# Patient Record
Sex: FEMALE | Race: WHITE
[De-identification: names, ages, dates, MRNs, and addresses within clinical notes are randomized per-mention and may not be internally consistent; named-entity substitution may affect disease eponyms.]

---

## 2019-02-27 ENCOUNTER — HOSPITAL ENCOUNTER (EMERGENCY)
Dept: HOSPITAL 96 - M.ERS | Age: 44
Discharge: HOME | End: 2019-02-27
Payer: COMMERCIAL

## 2019-02-27 VITALS — HEIGHT: 68 IN | WEIGHT: 250 LBS | BODY MASS INDEX: 37.89 KG/M2

## 2019-02-27 VITALS — SYSTOLIC BLOOD PRESSURE: 143 MMHG | DIASTOLIC BLOOD PRESSURE: 86 MMHG

## 2019-02-27 DIAGNOSIS — Z85.3: ICD-10-CM

## 2019-02-27 DIAGNOSIS — R10.11: Primary | ICD-10-CM

## 2019-02-27 DIAGNOSIS — R11.2: ICD-10-CM

## 2019-02-27 DIAGNOSIS — G43.909: ICD-10-CM

## 2019-02-27 DIAGNOSIS — Z88.5: ICD-10-CM

## 2019-02-27 DIAGNOSIS — Z90.13: ICD-10-CM

## 2019-02-27 DIAGNOSIS — Z90.710: ICD-10-CM

## 2019-02-27 DIAGNOSIS — Z88.6: ICD-10-CM

## 2019-02-27 DIAGNOSIS — F41.9: ICD-10-CM

## 2019-02-27 LAB
ABSOLUTE BASOPHILS: 0 THOU/UL (ref 0–0.2)
ABSOLUTE EOSINOPHILS: 0.2 THOU/UL (ref 0–0.7)
ABSOLUTE MONOCYTES: 0.6 THOU/UL (ref 0–1.2)
ALBUMIN SERPL-MCNC: 3.6 G/DL (ref 3.4–5)
ALP SERPL-CCNC: 140 U/L (ref 46–116)
ALT SERPL-CCNC: 76 U/L (ref 30–65)
ANION GAP SERPL CALC-SCNC: 5 MMOL/L (ref 7–16)
AST SERPL-CCNC: 36 U/L (ref 15–37)
BASOPHILS NFR BLD AUTO: 0.6 %
BILIRUB SERPL-MCNC: 0.2 MG/DL
BILIRUB UR-MCNC: NEGATIVE MG/DL
BUN SERPL-MCNC: 9 MG/DL (ref 7–18)
CALCIUM SERPL-MCNC: 8.6 MG/DL (ref 8.5–10.1)
CHLORIDE SERPL-SCNC: 103 MMOL/L (ref 98–107)
CO2 SERPL-SCNC: 31 MMOL/L (ref 21–32)
COLOR UR: YELLOW
CREAT SERPL-MCNC: 0.7 MG/DL (ref 0.6–1.3)
EOSINOPHIL NFR BLD: 3.5 %
GLUCOSE SERPL-MCNC: 110 MG/DL (ref 70–99)
GRANULOCYTES NFR BLD MANUAL: 57.3 %
HCT VFR BLD CALC: 37 % (ref 37–47)
HGB BLD-MCNC: 12.1 GM/DL (ref 12–15)
KETONES UR STRIP-MCNC: NEGATIVE MG/DL
LIPASE: 101 U/L (ref 73–393)
LYMPHOCYTES # BLD: 1.6 THOU/UL (ref 0.8–5.3)
LYMPHOCYTES NFR BLD AUTO: 27.9 %
MCH RBC QN AUTO: 27 PG (ref 26–34)
MCHC RBC AUTO-ENTMCNC: 32.7 G/DL (ref 28–37)
MCV RBC: 82.6 FL (ref 80–100)
MONOCYTES NFR BLD: 10.7 %
MPV: 8 FL. (ref 7.2–11.1)
NEUTROPHILS # BLD: 3.3 THOU/UL (ref 1.6–8.1)
NUCLEATED RBCS: 0 /100WBC
PLATELET COUNT*: 252 THOU/UL (ref 150–400)
POTASSIUM SERPL-SCNC: 3.7 MMOL/L (ref 3.5–5.1)
PROT SERPL-MCNC: 7.2 G/DL (ref 6.4–8.2)
PROT UR QL STRIP: NEGATIVE
RBC # BLD AUTO: 4.48 MIL/UL (ref 4.2–5)
RBC # UR STRIP: NEGATIVE /UL
RDW-CV: 14.6 % (ref 10.5–14.5)
SODIUM SERPL-SCNC: 139 MMOL/L (ref 136–145)
SP GR UR STRIP: 1.02 (ref 1–1.03)
TROPONIN-I LEVEL: <0.06 NG/ML (ref ?–0.06)
URINE CLARITY: CLEAR
URINE GLUCOSE-RANDOM: NEGATIVE
URINE LEUKOCYTES-REFLEX: NEGATIVE
URINE NITRITE-REFLEX: NEGATIVE
UROBILINOGEN UR STRIP-ACNC: 0.2 E.U./DL (ref 0.2–1)
WBC # BLD AUTO: 5.7 THOU/UL (ref 4–11)

## 2019-02-28 NOTE — EKG
[place] [address]
Phone:  [phone]                     ELECTROCARDIOGRAM REPORT      
_______________________________________________________________________________
 
Name:       NUHA ROSARIO                     Room:                      [facility]#:  [id number]      Account #:      [id number]  
Admission:  19     Attend Phys:                         
Discharge:  19     Date of Birth:  [birth year]  
         Report #: [id number]
    [id number]
_______________________________________________________________________________
THIS REPORT FOR:  //name//                      
 
                          [facility]
                                       
Test Date:    2019               Test Time:    16:28:24
Pat Name:     NUHA ROSARIO                 Department:   
Patient ID:   [id number]            Room:          
Gender:       F                        Technician:   ARIS
:          1975               Requested By: Rebeka Casarez
Order Number: [id number] MD:   Sonu Guzman
                                 Measurements
Intervals                              Axis          
Rate:         93                       P:            28
SC:           172                      QRS:          -6
QRSD:         95                       T:            7
QT:           380                                    
QTc:          473                                    
                           Interpretive Statements
Sinus rhythm
Compared to ECG 2014 09:07:51
Sinus tachycardia no longer present
 
Electronically Signed On 2019 11:16:33 CST by Sonu Guzman
[email or web address]
 
 
 
 
 
 
 
 
 
 
 
 
 
 
 
 
 
 
 
  <ELECTRONICALLY SIGNED>
                                           By: Sonu Guzman MD, [facility]   
  19     1116
D: 19 1628   _____________________________________
T: 19 1628   Sonu Guzman MD, FACC     /EPI

## 2020-01-07 ENCOUNTER — HOSPITAL ENCOUNTER (EMERGENCY)
Dept: HOSPITAL 96 - M.ERS | Age: 45
Discharge: HOME | End: 2020-01-07
Payer: COMMERCIAL

## 2020-01-07 VITALS — DIASTOLIC BLOOD PRESSURE: 96 MMHG | SYSTOLIC BLOOD PRESSURE: 163 MMHG

## 2020-01-07 VITALS — HEIGHT: 66 IN | BODY MASS INDEX: 41.79 KG/M2 | WEIGHT: 260.01 LBS

## 2020-01-07 DIAGNOSIS — F41.9: ICD-10-CM

## 2020-01-07 DIAGNOSIS — G89.29: ICD-10-CM

## 2020-01-07 DIAGNOSIS — G43.909: Primary | ICD-10-CM

## 2020-01-07 DIAGNOSIS — Z90.13: ICD-10-CM

## 2020-01-07 DIAGNOSIS — I10: ICD-10-CM

## 2020-01-07 DIAGNOSIS — Z85.3: ICD-10-CM

## 2020-01-07 DIAGNOSIS — Z88.6: ICD-10-CM

## 2020-01-07 DIAGNOSIS — Z90.710: ICD-10-CM
